# Patient Record
Sex: FEMALE | Race: ASIAN | NOT HISPANIC OR LATINO | URBAN - METROPOLITAN AREA
[De-identification: names, ages, dates, MRNs, and addresses within clinical notes are randomized per-mention and may not be internally consistent; named-entity substitution may affect disease eponyms.]

---
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
## 2021-05-30 VITALS — BODY MASS INDEX: 21.35 KG/M2 | HEIGHT: 62 IN | WEIGHT: 116 LBS

## 2021-06-08 NOTE — PROGRESS NOTES
Acne  Face  Years  Better with meds given before.  Ran out.  No side effects    ROS: as noted above    OBJECTIVE:   Vitals:    01/27/17 1355   BP: 100/58   Pulse: 76   Resp: 20      Eyes: non icteric, noninflamed  Lungs: no resp distress  Heart: regular  Ankles: no edema  Muscles: nontender  Mental status: euthymic  Neuro: nonfocal  Mild facial inflammatory papules    Chart shows clinda and benzoyl    ASSESSMENT/PLAN:    1. Acne vulgaris  benzoyl peroxide 5 % external liquid    clindamycin (CLEOCIN T) 1 % external solution     More than 10 of fifteen total minutes time spent education counseling regarding the issues and care of same as listed in the assessment and plan of this note